# Patient Record
Sex: FEMALE | Race: ASIAN | NOT HISPANIC OR LATINO
[De-identification: names, ages, dates, MRNs, and addresses within clinical notes are randomized per-mention and may not be internally consistent; named-entity substitution may affect disease eponyms.]

---

## 2019-03-25 ENCOUNTER — APPOINTMENT (OUTPATIENT)
Dept: CARDIOLOGY | Facility: CLINIC | Age: 69
End: 2019-03-25

## 2019-06-12 ENCOUNTER — APPOINTMENT (OUTPATIENT)
Dept: CARDIOLOGY | Facility: CLINIC | Age: 69
End: 2019-06-12
Payer: MEDICARE

## 2019-06-12 VITALS
TEMPERATURE: 98.5 F | WEIGHT: 125 LBS | HEART RATE: 94 BPM | OXYGEN SATURATION: 96 % | DIASTOLIC BLOOD PRESSURE: 68 MMHG | RESPIRATION RATE: 17 BRPM | BODY MASS INDEX: 20.8 KG/M2 | SYSTOLIC BLOOD PRESSURE: 101 MMHG

## 2019-06-12 PROCEDURE — 93224 XTRNL ECG REC UP TO 48 HRS: CPT

## 2019-06-12 PROCEDURE — 99214 OFFICE O/P EST MOD 30 MIN: CPT | Mod: 25

## 2019-06-12 PROCEDURE — 93306 TTE W/DOPPLER COMPLETE: CPT

## 2019-06-12 PROCEDURE — 93015 CV STRESS TEST SUPVJ I&R: CPT

## 2019-06-19 ENCOUNTER — NON-APPOINTMENT (OUTPATIENT)
Age: 69
End: 2019-06-19

## 2019-06-30 ENCOUNTER — RESULT REVIEW (OUTPATIENT)
Age: 69
End: 2019-06-30

## 2019-07-25 NOTE — HISTORY OF PRESENT ILLNESS
[FreeTextEntry1] : 68-year-old female with HLD, hyperglycemia, and hypothyroidism presents for followup.  Patient was last seen on 9/12/16 for evaluation of shortness of breath.   Patient underwent a stress echo and completed 10.5 minutes of Hermes protocol. There were upsloping ST depressions on ECG but there was no echocardiographic evidence of ischemia.  Patient reports that since March she has worsening palpitations when she walks fast.  She feels as if she would pass out and can't walk for more than a block without coughing.  She feels very tired and cannot lift her arm.  Patient reports SOB.  She has back problems and is getting physical therapy. \par

## 2019-07-25 NOTE — DISCUSSION/SUMMARY
[FreeTextEntry1] : 68-year-old female with HLD, hyperglycemia, and hypothyroidism presents for followup.  Patient was last seen on 9/12/16 for evaluation of shortness of breath.   Patient underwent a stress echo and completed 10.5 minutes of Hermes protocol. There were upsloping ST depressions on ECG but there was no echocardiographic evidence of ischemia.  Patient reports that since March she has worsening palpitations when she walks fast.  She feels as if she would pass out and can't walk for more than a block without coughing.  She feels very tired and cannot lift her arm.  Patient reports SOB.  She has back problems and is getting physical therapy. \par \par (1) SOB with exertion - Patient underwent an echocardiogram and it showed normal LV function without significant valvular pathology. Patient underwent a treadmill stress test and completed 9 minutes of Hermes protocol.  There were upsloping ST depressions on ECG but no symptoms.  Patient appears to be at low risk for having significant CAD.  Her symptom may be pulmonary in etiology.\par \par (2) Palpitations - Patient wore a Holter and it showed APC's without significant arrhythmia. Patient was reassured. \par \par (3) Followup - as needed.

## 2020-05-14 ENCOUNTER — APPOINTMENT (OUTPATIENT)
Dept: CARDIOLOGY | Facility: CLINIC | Age: 70
End: 2020-05-14
Payer: MEDICARE

## 2020-05-14 VITALS
HEART RATE: 89 BPM | BODY MASS INDEX: 21.13 KG/M2 | WEIGHT: 127 LBS | DIASTOLIC BLOOD PRESSURE: 84 MMHG | SYSTOLIC BLOOD PRESSURE: 135 MMHG | TEMPERATURE: 97.9 F | OXYGEN SATURATION: 97 % | RESPIRATION RATE: 17 BRPM

## 2020-05-14 PROCEDURE — 99214 OFFICE O/P EST MOD 30 MIN: CPT | Mod: 25

## 2020-05-14 PROCEDURE — 93306 TTE W/DOPPLER COMPLETE: CPT

## 2020-05-14 PROCEDURE — 93015 CV STRESS TEST SUPVJ I&R: CPT

## 2020-05-22 NOTE — PHYSICAL EXAM
[General Appearance - Well Developed] : well developed [Normal Appearance] : normal appearance [Well Groomed] : well groomed [General Appearance - Well Nourished] : well nourished [No Deformities] : no deformities [General Appearance - In No Acute Distress] : no acute distress [Normal Conjunctiva] : the conjunctiva exhibited no abnormalities [Eyelids - No Xanthelasma] : the eyelids demonstrated no xanthelasmas [Normal Oral Mucosa] : normal oral mucosa [No Oral Pallor] : no oral pallor [No Oral Cyanosis] : no oral cyanosis [Normal Jugular Venous A Waves Present] : normal jugular venous A waves present [Normal Jugular Venous V Waves Present] : normal jugular venous V waves present [No Jugular Venous [place] A Waves] : no jugular venous livingston A waves [Respiration, Rhythm And Depth] : normal respiratory rhythm and effort [Exaggerated Use Of Accessory Muscles For Inspiration] : no accessory muscle use [Auscultation Breath Sounds / Voice Sounds] : lungs were clear to auscultation bilaterally [Heart Rate And Rhythm] : heart rate and rhythm were normal [Heart Sounds] : normal S1 and S2 [Arterial Pulses Normal] : the arterial pulses were normal [Edema] : no peripheral edema present [Abdomen Soft] : soft [Abdomen Tenderness] : non-tender [Abdomen Mass (___ Cm)] : no abdominal mass palpated [Abnormal Walk] : normal gait [Gait - Sufficient For Exercise Testing] : the gait was sufficient for exercise testing [Nail Clubbing] : no clubbing of the fingernails [Cyanosis, Localized] : no localized cyanosis [Petechial Hemorrhages (___cm)] : no petechial hemorrhages [] : no ischemic changes [Oriented To Time, Place, And Person] : oriented to person, place, and time [Affect] : the affect was normal [Mood] : the mood was normal [No Anxiety] : not feeling anxious [FreeTextEntry1] : 3/6 NATALEE at apex, 2/6 NATALEE along left sternal border

## 2020-05-22 NOTE — DISCUSSION/SUMMARY
[FreeTextEntry1] : 69-year-old female with HLD, hyperglycemia, and hypothyroidism presents for followup.  Patient was last seen on 6/12/19.  Patient underwent an echocardiogram and it showed normal LV function without significant valvular pathology. Patient underwent a treadmill stress test and completed 9 minutes of Hermes protocol.  There were upsloping ST depressions on ECG but no symptoms.  Patient appears to be at low risk for having significant CAD.  Her symptom may be pulmonary in etiology.   Patient wore a Holter and it showed APC's without significant arrhythmia.  \par \par Patient reports that for the past 6 months she has been experiencing chest discomfort with exertion especially after breakfast from walking just one block.  Patient had CXR last year during regular check up and was negative.  Patient has not seen pulmonary.  I advised patient to undergo an echocardiogram and a treadmill stress test. \par \par (1) CP with exertion - Patient underwent an echocardiogram and it showed normal LV function without significant valvular pathology.  Patient underwent a treadmill stress test and completed 9.5 minutes of Hermes protocol.  There was no ECG evidence of ischemia.  Given the exertional nature of her symptom I remain concerned about CAD.  I advised patient to undergo a CTA of coronary arteries.  I will obtain insurance authorization.  Patient was noted to have DARINEL following treadmill stress with gradient of 110 mmHg.  I advised patient to start Metoprolol ER 25 mg.\par \par (2) Followup - pending CTA.

## 2020-05-22 NOTE — HISTORY OF PRESENT ILLNESS
[FreeTextEntry1] : 69-year-old female with HLD, hyperglycemia, and hypothyroidism presents for followup.  Patient was last seen on 6/12/19.  Patient underwent an echocardiogram and it showed normal LV function without significant valvular pathology. Patient underwent a treadmill stress test and completed 9 minutes of Hermes protocol.  There were upsloping ST depressions on ECG but no symptoms.  Patient appears to be at low risk for having significant CAD.  Her symptom may be pulmonary in etiology.   Patient wore a Holter and it showed APC's without significant arrhythmia.  \par \par Patient reports that for the past 6 months she has been experiencing chest discomfort with exertion especially after breakfast from walking just one block.  Patient had CXR last year during regular check up and was negative.  Patient has not seen pulmonary.  I advised patient to undergo an echocardiogram and a treadmill stress test.

## 2021-06-18 ENCOUNTER — RX RENEWAL (OUTPATIENT)
Age: 71
End: 2021-06-18

## 2021-11-01 ENCOUNTER — APPOINTMENT (OUTPATIENT)
Dept: CARDIOLOGY | Facility: CLINIC | Age: 71
End: 2021-11-01
Payer: MEDICARE

## 2021-11-01 VITALS
TEMPERATURE: 97.8 F | WEIGHT: 121 LBS | BODY MASS INDEX: 20.14 KG/M2 | DIASTOLIC BLOOD PRESSURE: 85 MMHG | OXYGEN SATURATION: 98 % | HEART RATE: 66 BPM | RESPIRATION RATE: 18 BRPM | SYSTOLIC BLOOD PRESSURE: 131 MMHG

## 2021-11-01 DIAGNOSIS — R00.2 PALPITATIONS: ICD-10-CM

## 2021-11-01 PROCEDURE — 93015 CV STRESS TEST SUPVJ I&R: CPT

## 2021-11-01 PROCEDURE — 99214 OFFICE O/P EST MOD 30 MIN: CPT | Mod: 25

## 2021-11-01 PROCEDURE — 93306 TTE W/DOPPLER COMPLETE: CPT

## 2021-11-04 ENCOUNTER — NON-APPOINTMENT (OUTPATIENT)
Age: 71
End: 2021-11-04

## 2021-11-08 ENCOUNTER — NON-APPOINTMENT (OUTPATIENT)
Age: 71
End: 2021-11-08

## 2022-08-07 NOTE — PHYSICAL EXAM
[General Appearance - Well Developed] : well developed [Well Groomed] : well groomed [Normal Appearance] : normal appearance [General Appearance - Well Nourished] : well nourished [No Deformities] : no deformities [General Appearance - In No Acute Distress] : no acute distress [Normal Conjunctiva] : the conjunctiva exhibited no abnormalities [Eyelids - No Xanthelasma] : the eyelids demonstrated no xanthelasmas [Normal Oral Mucosa] : normal oral mucosa [No Oral Pallor] : no oral pallor [No Oral Cyanosis] : no oral cyanosis [Normal Jugular Venous A Waves Present] : normal jugular venous A waves present [Normal Jugular Venous V Waves Present] : normal jugular venous V waves present [No Jugular Venous [place] A Waves] : no jugular venous livingston A waves [Respiration, Rhythm And Depth] : normal respiratory rhythm and effort [Exaggerated Use Of Accessory Muscles For Inspiration] : no accessory muscle use [Auscultation Breath Sounds / Voice Sounds] : lungs were clear to auscultation bilaterally [Heart Rate And Rhythm] : heart rate and rhythm were normal [Arterial Pulses Normal] : the arterial pulses were normal [Heart Sounds] : normal S1 and S2 [Edema] : no peripheral edema present [Abdomen Soft] : soft [Abdomen Tenderness] : non-tender [Abdomen Mass (___ Cm)] : no abdominal mass palpated [Abnormal Walk] : normal gait [Gait - Sufficient For Exercise Testing] : the gait was sufficient for exercise testing [Nail Clubbing] : no clubbing of the fingernails [Cyanosis, Localized] : no localized cyanosis [Petechial Hemorrhages (___cm)] : no petechial hemorrhages [] : no ischemic changes [Oriented To Time, Place, And Person] : oriented to person, place, and time [Affect] : the affect was normal [Mood] : the mood was normal [No Anxiety] : not feeling anxious [FreeTextEntry1] : 3/6 NATALEE at apex, 2/6 NATALEE along left sternal border

## 2022-08-07 NOTE — REASON FOR VISIT
[Symptom and Test Evaluation] : symptom and test evaluation [FreeTextEntry1] : 71-year-old female with DARINEL, HLD, hyperglycemia, and hypothyroidism presents for followup.  \par \par Patient was last seen on 5/14/20 for CP.   Patient underwent an echocardiogram and it showed normal LV function without significant valvular pathology.  Patient underwent a treadmill stress test and completed 9 minutes of Hermes protocol.  There was no ECG evidence of ischemia.  DARINEL was noted following peak exercise, with peak outflow gradient of 110 mmHg.  I advised patient to start Metoprolol ER 25 mg.  Patient underwent CTA of the coronary arteries showed normal coronary arteries with calcium score of 0.

## 2022-08-07 NOTE — HISTORY OF PRESENT ILLNESS
[FreeTextEntry1] : 11/1/21- Patient reports palpitations lasting just a second. Patient reports CP with exertion. Patient denies SOB with exertion. Patient denies lightheadedness.  Patient reports that her skipped beats are better. I advised patient to wear a Holter monitor. I advised patient to undergo an echocardiogram and a treadmill stress test. \par \par 5/14/20 - Patient reports that for the past 6 months she has been experiencing chest discomfort with exertion especially after breakfast from walking just one block.  Patient had CXR last year during regular check up and was negative.  Patient has not seen pulmonary.  I advised patient to undergo an echocardiogram and a treadmill stress test.

## 2022-08-09 ENCOUNTER — APPOINTMENT (OUTPATIENT)
Dept: CARDIOLOGY | Facility: CLINIC | Age: 72
End: 2022-08-09

## 2022-08-09 VITALS
DIASTOLIC BLOOD PRESSURE: 76 MMHG | RESPIRATION RATE: 18 BRPM | TEMPERATURE: 97.5 F | BODY MASS INDEX: 20.63 KG/M2 | SYSTOLIC BLOOD PRESSURE: 115 MMHG | WEIGHT: 124 LBS | OXYGEN SATURATION: 98 % | HEART RATE: 72 BPM

## 2022-08-09 DIAGNOSIS — R07.89 OTHER CHEST PAIN: ICD-10-CM

## 2022-08-09 DIAGNOSIS — R06.02 SHORTNESS OF BREATH: ICD-10-CM

## 2022-08-09 PROCEDURE — 93015 CV STRESS TEST SUPVJ I&R: CPT

## 2022-08-09 PROCEDURE — 99214 OFFICE O/P EST MOD 30 MIN: CPT | Mod: 25

## 2022-08-10 NOTE — REASON FOR VISIT
[FreeTextEntry1] : 71-year-old female with DARINEL, HLD, hyperglycemia, and hypothyroidism presents for followup.  \par \par Patient was last seen on 11/1/21 for CP with exertion.  I advised patient to wear a Holter monitor.  I advised patient to undergo an echocardiogram and a treadmill stress test.  Patient underwent an echocardiogram and it showed normal LV function without significant valvular pathology. Patient underwent a treadmill stress test and completed 9.5 minutes of Hermes protocol.  There were upsloping ST depressions on ECG but no symptoms.  Following treadmill stress, there was no echocardiographic evidence of ischemia.  No significant outflow gradient noted.  Patient wore a Holter and it showed average HR 69, rare PACs.\par \par She is on Metoprolol ER 25 mg for DARINEL.\par \par Patient underwent an echocardiogram 5/14/20 and it showed normal LV function without significant valvular pathology.  \par \par Patient underwent a treadmill stress test 5/14/20 and completed 9 minutes of Hermes protocol.  There was no ECG evidence of ischemia.  DARINEL was noted following peak exercise, with peak outflow gradient of 110 mmHg.  I advised patient to start Metoprolol ER 25 mg.  \par \par Patient underwent CTA of the coronary arteries 6/5/20 showed normal coronary arteries with calcium score of 0.

## 2022-08-10 NOTE — HISTORY OF PRESENT ILLNESS
[FreeTextEntry1] : 8/9//22 - Patient reports worsening SSCP and SOB.  Patient reports decreased exercise tolerance when stomach is full.  She is taking Metoprolol ER 25 mg.  She is also on Losartan 25 mg for HTN.  She has no recent CXR.  2/6 NATALEE noted.  I advised patient to undergo an echocardiogram and a treadmill stress test.  I will obtain insurance authorization (CP, SOB, possible LV dysfunction and CAD).  \par \par 11/1/21- Patient reports palpitations lasting just a second. Patient reports CP with exertion. Patient denies SOB with exertion. Patient denies lightheadedness.  Patient reports that her skipped beats are better. I advised patient to wear a Holter monitor. I advised patient to undergo an echocardiogram and a treadmill stress test.  Patient underwent an echocardiogram and it showed normal LV function without significant valvular pathology. Patient underwent a treadmill stress test and completed 9.5 minutes of Hermes protocol.  There were upsloping ST depressions on ECG but no symptoms.  Following treadmill stress, there was no echocardiographic evidence of ischemia.  No significant outflow gradient noted.  Patient wore a Holter and it showed average HR 69, rare PACs.\par \par 5/14/20 - Patient reports that for the past 6 months she has been experiencing chest discomfort with exertion especially after breakfast from walking just one block.  Patient had CXR last year during regular check up and was negative.  Patient has not seen pulmonary.  I advised patient to undergo an echocardiogram and a treadmill stress test.

## 2022-08-18 ENCOUNTER — APPOINTMENT (OUTPATIENT)
Dept: CARDIOLOGY | Facility: CLINIC | Age: 72
End: 2022-08-18

## 2022-08-18 VITALS — DIASTOLIC BLOOD PRESSURE: 77 MMHG | TEMPERATURE: 97.6 F | SYSTOLIC BLOOD PRESSURE: 123 MMHG

## 2022-08-18 PROCEDURE — 93306 TTE W/DOPPLER COMPLETE: CPT

## 2022-08-19 PROBLEM — R07.89 CHEST DISCOMFORT: Status: ACTIVE | Noted: 2020-05-14

## 2022-09-16 RX ORDER — LOSARTAN POTASSIUM 25 MG/1
25 TABLET, FILM COATED ORAL
Refills: 0 | Status: ACTIVE | COMMUNITY

## 2022-09-16 RX ORDER — SIMVASTATIN 10 MG/1
10 TABLET, FILM COATED ORAL
Refills: 0 | Status: ACTIVE | COMMUNITY

## 2023-09-19 ENCOUNTER — APPOINTMENT (OUTPATIENT)
Dept: CARDIOLOGY | Facility: CLINIC | Age: 73
End: 2023-09-19
Payer: MEDICARE

## 2023-09-19 ENCOUNTER — NON-APPOINTMENT (OUTPATIENT)
Age: 73
End: 2023-09-19

## 2023-09-19 VITALS
SYSTOLIC BLOOD PRESSURE: 143 MMHG | DIASTOLIC BLOOD PRESSURE: 86 MMHG | RESPIRATION RATE: 18 BRPM | BODY MASS INDEX: 20.47 KG/M2 | OXYGEN SATURATION: 97 % | WEIGHT: 123 LBS | HEART RATE: 70 BPM | TEMPERATURE: 98 F

## 2023-09-19 DIAGNOSIS — I34.89 OTHER NONRHEUMATIC MITRAL VALVE DISORDERS: ICD-10-CM

## 2023-09-19 DIAGNOSIS — I10 ESSENTIAL (PRIMARY) HYPERTENSION: ICD-10-CM

## 2023-09-19 PROCEDURE — 99214 OFFICE O/P EST MOD 30 MIN: CPT

## 2023-09-19 PROCEDURE — 93000 ELECTROCARDIOGRAM COMPLETE: CPT

## 2023-09-19 RX ORDER — METOPROLOL SUCCINATE 25 MG/1
25 TABLET, EXTENDED RELEASE ORAL TWICE DAILY
Qty: 180 | Refills: 1 | Status: ACTIVE | COMMUNITY
Start: 2020-05-14 | End: 1900-01-01

## 2023-09-22 ENCOUNTER — NON-APPOINTMENT (OUTPATIENT)
Age: 73
End: 2023-09-22

## 2023-09-27 PROBLEM — I10 HTN (HYPERTENSION): Status: ACTIVE | Noted: 2022-08-19

## 2023-09-27 PROBLEM — I34.89 SYSTOLIC ANTERIOR MOVEMENT OF MITRAL VALVE: Status: ACTIVE | Noted: 2021-10-30

## 2024-08-13 ENCOUNTER — APPOINTMENT (OUTPATIENT)
Dept: CARDIOLOGY | Facility: CLINIC | Age: 74
End: 2024-08-13

## 2024-08-13 VITALS
HEART RATE: 66 BPM | WEIGHT: 123 LBS | RESPIRATION RATE: 18 BRPM | BODY MASS INDEX: 20.47 KG/M2 | OXYGEN SATURATION: 100 % | DIASTOLIC BLOOD PRESSURE: 78 MMHG | SYSTOLIC BLOOD PRESSURE: 134 MMHG

## 2024-08-13 DIAGNOSIS — I34.89 OTHER NONRHEUMATIC MITRAL VALVE DISORDERS: ICD-10-CM

## 2024-08-13 DIAGNOSIS — R07.89 OTHER CHEST PAIN: ICD-10-CM

## 2024-08-13 DIAGNOSIS — I10 ESSENTIAL (PRIMARY) HYPERTENSION: ICD-10-CM

## 2024-08-13 PROCEDURE — 93015 CV STRESS TEST SUPVJ I&R: CPT

## 2024-08-13 PROCEDURE — 93306 TTE W/DOPPLER COMPLETE: CPT

## 2024-08-13 PROCEDURE — 99214 OFFICE O/P EST MOD 30 MIN: CPT | Mod: 25

## 2024-08-13 NOTE — DISCUSSION/SUMMARY
[FreeTextEntry1] : 73 year-old female with DARINEL, HLD, hyperglycemia, and hypothyroidism presents for followup.    Patient was last seen on 8/9//22 - Patient reports worsening SSCP and SOB. Patient reports decreased exercise tolerance when stomach is full. She is taking Metoprolol ER 25 mg. She is also on Losartan 25 mg for HTN. She has no recent CXR. 2/6 NATALEE noted. I advised patient to undergo an echocardiogram and a treadmill stress test.  Patient underwent an echocardiogram and it showed normal LV function with mild AR.  Patient underwent a treadmill stress test and completed 9.5 minutes of Hermes protocol.  There were upsloping ST depressions on ECG but no symptoms.  Following treadmill stress, there was no echocardiographic evidence of ischemia.   She is on Metoprolol ER 25 mg for DARINEL.  Patient underwent an echocardiogram  11/1/21 and it showed normal LV function without significant valvular pathology.   Patient underwent a treadmill stress test  11/1/21 and completed 9.5 minutes of Hermes protocol.  There were upsloping ST depressions on ECG but no symptoms.  Following treadmill stress, there was no echocardiographic evidence of ischemia.  No significant outflow gradient noted.    Patient wore a Holter  11/1/21 and it showed average HR 69, rare PACs.  Patient underwent an echocardiogram 5/14/20 and it showed normal LV function without significant valvular pathology.    Patient underwent a treadmill stress test 5/14/20 and completed 9 minutes of Hermes protocol.  There was no ECG evidence of ischemia.  DARINEL was noted following peak exercise, with peak outflow gradient of 110 mmHg.  I advised patient to start Metoprolol ER 25 mg.    Patient underwent CTA of the coronary arteries 6/5/20 showed normal coronary arteries with calcium score of 0.  9/19/23 - Patient reports palpitations, described as skipped heartbeats.  Patient reports SOB going uphill.  Patient denies CP.  She is on Metoprolol ER 25 mg for DARINEL.  ECG showed no ischemic changes.  I advised patient to wear a Holter monitor.  Patient wore a Holter and it showed average HR of 69, short runs of PAT (17 beats the longest).  I advised patient to increase Metoprolol ER 25 mg to BID.  (1) Palpitations - Patient wore a Holter and it showed average HR of 69, short runs of PAT (17 beats the longest).  I advised patient to increase Metoprolol ER 25 mg to BID.  (2) Followup - 1 year.

## 2024-08-13 NOTE — HISTORY OF PRESENT ILLNESS
[FreeTextEntry1] : 8/13/24 - I advised patient to undergo an echocardiogram and a treadmill stress test.   9/19/23 - Patient reports palpitations, described as skipped heartbeats.  Patient reports SOB going uphill.  Patient denies CP.  She is on Metoprolol ER 25 mg for DARINEL.  ECG showed no ischemic changes.  I advised patient to wear a Holter monitor.  Patient wore a Holter and it showed average HR of 69, short runs of PAT (17 beats the longest).  I advised patient to increase Metoprolol ER 25 mg to BID.  8/9//22 - Patient reports worsening SSCP and SOB.  Patient reports decreased exercise tolerance when stomach is full.  She is taking Metoprolol ER 25 mg.  She is also on Losartan 25 mg for HTN.  She has no recent CXR.  2/6 NATALEE noted.  I advised patient to undergo an echocardiogram and a treadmill stress test.  Patient underwent an echocardiogram and it showed normal LV function with mild AR.  Patient underwent a treadmill stress test and completed 9.5 minutes of Hermes protocol.  There were upsloping ST depressions on ECG but no symptoms.  Following treadmill stress, there was no echocardiographic evidence of ischemia.   11/1/21- Patient reports palpitations lasting just a second. Patient reports CP with exertion. Patient denies SOB with exertion. Patient denies lightheadedness.  Patient reports that her skipped beats are better. I advised patient to wear a Holter monitor. I advised patient to undergo an echocardiogram and a treadmill stress test.  Patient underwent an echocardiogram and it showed normal LV function without significant valvular pathology. Patient underwent a treadmill stress test and completed 9.5 minutes of Hermes protocol.  There were upsloping ST depressions on ECG but no symptoms.  Following treadmill stress, there was no echocardiographic evidence of ischemia.  No significant outflow gradient noted.  Patient wore a Holter and it showed average HR 69, rare PACs.  5/14/20 - Patient reports that for the past 6 months she has been experiencing chest discomfort with exertion especially after breakfast from walking just one block.  Patient had CXR last year during regular check up and was negative.  Patient has not seen pulmonary.  I advised patient to undergo an echocardiogram and a treadmill stress test.

## 2024-08-13 NOTE — REASON FOR VISIT
[FreeTextEntry1] : 73 year-old female with DARINEL, HLD, hyperglycemia, hypothyroidism, presents for followup.    Patient was last seen on 9/19/23 - Patient reports palpitations, described as skipped heartbeats. Patient reports SOB going uphill. Patient denies CP. She is on Metoprolol ER 25 mg for DARINEL. ECG showed no ischemic changes. I advised patient to wear a Holter monitor.  Patient wore a Holter and it showed average HR of 69, short runs of PAT (17 beats the longest).  I advised patient to increase Metoprolol ER 25 mg to BID.  She is on Metoprolol ER 25 mg for DARINEL.  Patient underwent an echocardiogram 8/9//22 and it showed normal LV function with mild AR.    Patient underwent a treadmill stress test 8/9//22 and completed 9.5 minutes of Hermes protocol.  There were upsloping ST depressions on ECG but no symptoms.  Following treadmill stress, there was no echocardiographic evidence of ischemia.   Patient underwent an echocardiogram  11/1/21 and it showed normal LV function without significant valvular pathology.   Patient underwent a treadmill stress test  11/1/21 and completed 9.5 minutes of Hermes protocol.  There were upsloping ST depressions on ECG but no symptoms.  Following treadmill stress, there was no echocardiographic evidence of ischemia.  No significant outflow gradient noted.    Patient wore a Holter  11/1/21 and it showed average HR 69, rare PACs.   Patient underwent an echocardiogram 5/14/20 and it showed normal LV function without significant valvular pathology.    Patient underwent a treadmill stress test 5/14/20 and completed 9 minutes of Hermes protocol.  There was no ECG evidence of ischemia.  DARINEL was noted following peak exercise, with peak outflow gradient of 110 mmHg.  I advised patient to start Metoprolol ER 25 mg.    Patient underwent CTA of the coronary arteries 6/5/20 showed normal coronary arteries with calcium score of 0.

## 2025-08-26 ENCOUNTER — NON-APPOINTMENT (OUTPATIENT)
Age: 75
End: 2025-08-26

## 2025-08-27 ENCOUNTER — APPOINTMENT (OUTPATIENT)
Dept: CARDIOLOGY | Facility: CLINIC | Age: 75
End: 2025-08-27
Payer: MEDICARE

## 2025-08-27 VITALS
WEIGHT: 125 LBS | HEART RATE: 66 BPM | DIASTOLIC BLOOD PRESSURE: 82 MMHG | RESPIRATION RATE: 18 BRPM | OXYGEN SATURATION: 99 % | BODY MASS INDEX: 20.8 KG/M2 | SYSTOLIC BLOOD PRESSURE: 150 MMHG

## 2025-08-27 DIAGNOSIS — R00.2 PALPITATIONS: ICD-10-CM

## 2025-08-27 DIAGNOSIS — I10 ESSENTIAL (PRIMARY) HYPERTENSION: ICD-10-CM

## 2025-08-27 DIAGNOSIS — I34.89 OTHER NONRHEUMATIC MITRAL VALVE DISORDERS: ICD-10-CM

## 2025-08-27 PROCEDURE — 99214 OFFICE O/P EST MOD 30 MIN: CPT | Mod: 25

## 2025-08-27 PROCEDURE — 93000 ELECTROCARDIOGRAM COMPLETE: CPT
